# Patient Record
Sex: FEMALE | Race: OTHER | URBAN - METROPOLITAN AREA
[De-identification: names, ages, dates, MRNs, and addresses within clinical notes are randomized per-mention and may not be internally consistent; named-entity substitution may affect disease eponyms.]

---

## 2024-04-16 ENCOUNTER — OFFICE VISIT (OUTPATIENT)
Facility: CLINIC | Age: 29
End: 2024-04-16
Payer: MEDICAID

## 2024-04-16 VITALS
WEIGHT: 230 LBS | HEIGHT: 62.5 IN | BODY MASS INDEX: 41.27 KG/M2 | RESPIRATION RATE: 16 BRPM | OXYGEN SATURATION: 97 % | HEART RATE: 106 BPM | SYSTOLIC BLOOD PRESSURE: 132 MMHG | DIASTOLIC BLOOD PRESSURE: 84 MMHG

## 2024-04-16 DIAGNOSIS — J45.50 SEVERE PERSISTENT ASTHMA WITHOUT COMPLICATION (HCC): Primary | ICD-10-CM

## 2024-04-16 PROCEDURE — 99204 OFFICE O/P NEW MOD 45 MIN: CPT | Performed by: INTERNAL MEDICINE

## 2024-04-16 RX ORDER — ALBUTEROL SULFATE 90 UG/1
2 AEROSOL, METERED RESPIRATORY (INHALATION) EVERY 4 HOURS
COMMUNITY
Start: 2023-05-04

## 2024-04-16 RX ORDER — BUDESONIDE 0.5 MG/2ML
0.5 INHALANT ORAL 2 TIMES DAILY
Qty: 120 ML | Refills: 3 | Status: SHIPPED | OUTPATIENT
Start: 2024-04-16

## 2024-04-16 RX ORDER — FLUTICASONE FUROATE, UMECLIDINIUM BROMIDE AND VILANTEROL TRIFENATATE 200; 62.5; 25 UG/1; UG/1; UG/1
1 POWDER RESPIRATORY (INHALATION) DAILY
Qty: 60 EACH | Refills: 1 | Status: SHIPPED | OUTPATIENT
Start: 2024-04-16

## 2024-04-16 RX ORDER — MONTELUKAST SODIUM 10 MG/1
10 TABLET ORAL NIGHTLY
COMMUNITY
Start: 2023-12-17

## 2024-04-16 NOTE — PROGRESS NOTES
[facility] General Pulmonary Consult Note    Chief Complaint:  Chief Complaint   Patient presents with    New Patient     Pt having more frequent asthma attacks       History of Present Illness:  Shruthi Ludwig is a 29 year old female with significant PMH of asthma who presents today for evaluation of worsening of asthma symptoms.  Patient has been sick 3 times in 2023 reuslting in hospitalizations and ER visits/prednisone usage.  Initialy CBC showed significant elevated eosinphils 1080 follow up CBC not performed.  Previous peripheral eos have shown 580+.  Previous smoker, has quit about 6 months ago.  Reports significant anxiety around same time of having PTSD.      Past Medical History:   Past Medical History:    Allergic rhinitis    Anxiety    Asthma (HCC)    Depression    Obesity    Pneumonia due to organism        Past Surgical History:   Past Surgical History:   Procedure Laterality Date          Other surgical history  N/A    [place] teeth       Family Medical History:   Family History   Problem Relation Age of Onset    Depression Mother     Diabetes Mother         Social History:   Social History     Socioeconomic History    Marital status:      Spouse name: Not on file    Number of children: Not on file    Years of education: Not on file    Highest education level: Not on file   Occupational History    Not on file   Tobacco Use    Smoking status: Never     Passive exposure: Past    Smokeless tobacco: Never   Substance and Sexual Activity    Alcohol use: Not Currently    Drug use: Yes     Types: Cannabis     Comment: edibles    Sexual activity: Not on file   Other Topics Concern    Not on file   Social History Narrative    Not on file     Social Determinants of Health     Financial Resource Strain: Not on file   Food Insecurity: Low Risk  (3/21/2024)    Received from [facility]     Within the past 12 months, the food you bought just didn't last and you didn't have money  to get more.: 3     Within the past 12 months, you worried that your food would run out before you got money to buy more.: 3   Transportation Needs: Not At Risk (3/21/2024)    Received from [facility] Transportation Needs     In the past 12 months, has lack of reliable transportation kept you from medical appointments, meetings, work or from getting things needed for daily living?: No   Physical Activity: Not on file   Stress: Not on file   Social Connections: Not on file   Housing Stability: Not At Risk (3/21/2024)    Received from [facility] Housing     What is your living situation today?: I have a steady place to live     Think about the place you live. Do you have problems with any of the following?: None of the above        Allergies: Mushrooms     Medications:   Current Outpatient Medications   Medication Sig Dispense Refill    albuterol 108 (90 Base) MCG/ACT Inhalation Aero Soln Inhale 2 puffs into the lungs every 4 (four) hours.      montelukast 10 MG Oral Tab Take 1 tablet (10 mg total) by mouth nightly.      fluticasone-umeclidin-vilant (TRELEGY ELLIPTA) 200-62.5-25 MCG/ACT Inhalation Aerosol Powder, Breath Activated Inhale 1 puff into the lungs daily. 60 each 1    budesonide 0.5 MG/2ML Inhalation Suspension Take 2 mL (0.5 mg total) by nebulization 2 (two) times daily. 120 mL 3    Respiratory Therapy Supplies (FULL KIT NEBULIZER SET) Does not apply Misc 1 kit As Directed. 1 each 0       Review of Systems: Review of Systems    Physical Exam:  /84 (BP Location: Right arm, Patient Position: Sitting, Cuff Size: adult)   Pulse 106   Resp 16   Ht 5' 2.5\" (1.588 m)   Wt 230 lb (104.3 kg)   SpO2 97%   BMI 41.40 kg/m²      Constitutional: alert, cooperative. No acute distress.  HEENT: Head NC/AT. Nares normal. Septum midline. Mucosa normal. No drainage or sinus tenderness.. Mallampati 3+  Cardio: Regular rate and rhythm. Normal S1 and S2. No murmurs.   Respiratory: Thorax symmetrical  with no labored breathing. rhonchi bilaterally  GI: NABS. Abd soft, non-tender.  Extremities: No clubbing or cyanosis. No BLE edema.    Neurologic: A&Ox3. No gross motor deficits.  Skin: Warm, dry  Psych: Calm, cooperative. Pleasant affect.    Results:  Personally reviewed  No CBC panel on file.     No CMP panel on file.     No results found.     Assessment/Plan:  #1. Moderate vs severe persistent asthma  Significant eosinophilia noted on CBCs  Will start trelegy with albuterol as needed  Will start budesonide nebs  Check PFTs  Check allergy screen        Return in about 4 weeks (around 5/14/2024).    Melissa Glass MD  4/16/2024

## 2024-04-24 ENCOUNTER — TELEPHONE (OUTPATIENT)
Facility: CLINIC | Age: 29
End: 2024-04-24

## 2024-04-24 RX ORDER — DEXAMETHASONE 4 MG/1
2 TABLET ORAL 2 TIMES DAILY
Qty: 1 EACH | Refills: 1 | Status: SHIPPED | OUTPATIENT
Start: 2024-04-24 | End: 2024-04-24

## 2024-04-24 RX ORDER — UMECLIDINIUM 62.5 UG/1
1 AEROSOL, POWDER ORAL DAILY
Qty: 1 EACH | Refills: 1 | Status: SHIPPED | OUTPATIENT
Start: 2024-04-24

## 2024-04-24 RX ORDER — UMECLIDINIUM BROMIDE AND VILANTEROL TRIFENATATE 62.5; 25 UG/1; UG/1
1 POWDER RESPIRATORY (INHALATION) DAILY
Qty: 1 EACH | Refills: 1 | Status: SHIPPED | OUTPATIENT
Start: 2024-04-24 | End: 2024-04-24

## 2024-04-24 RX ORDER — BUDESONIDE AND FORMOTEROL FUMARATE DIHYDRATE 160; 4.5 UG/1; UG/1
2 AEROSOL RESPIRATORY (INHALATION) 2 TIMES DAILY
Qty: 1 EACH | Refills: 1 | Status: SHIPPED | OUTPATIENT
Start: 2024-04-24

## 2024-04-24 NOTE — TELEPHONE ENCOUNTER
Trelegy 200-62.5-25 not covered by insurance.  Advair disc, Advair HFA, Airduo, Dulera, Symbicort, and Anoro are preferred alternatives.  Please advise inhalers and strength to substitute.

## 2024-04-25 ENCOUNTER — TELEPHONE (OUTPATIENT)
Facility: CLINIC | Age: 29
End: 2024-04-25

## 2024-04-25 NOTE — TELEPHONE ENCOUNTER
Pt was approved for Anoro. Pt would need a steroid inhaler like Asmanex to add instead of the other which insurance denied.    Please note multiple TE regarding the same issue.

## 2024-04-25 NOTE — TELEPHONE ENCOUNTER
Advised pharmacist to remove Anoro from pt's profile.  Symbicort + Incruse are now covered.  Pt notified via message.

## 2024-05-06 ENCOUNTER — TELEPHONE (OUTPATIENT)
Facility: CLINIC | Age: 29
End: 2024-05-06

## 2024-05-06 NOTE — TELEPHONE ENCOUNTER
Pt cannot have labs or PFT done here at [facility].  Pt requesting they be sent to [facility].  Orders faxed to [phone]

## 2024-05-06 NOTE — TELEPHONE ENCOUNTER
Pt needed her PFT & Labs orders faxed to [facility].     Sent by hardcopy print to fax [phone]

## 2024-07-08 ENCOUNTER — OFFICE VISIT (OUTPATIENT)
Facility: CLINIC | Age: 29
End: 2024-07-08
Payer: MEDICAID

## 2024-07-08 VITALS
DIASTOLIC BLOOD PRESSURE: 52 MMHG | WEIGHT: 241 LBS | BODY MASS INDEX: 44.35 KG/M2 | OXYGEN SATURATION: 97 % | SYSTOLIC BLOOD PRESSURE: 132 MMHG | HEART RATE: 94 BPM | HEIGHT: 62 IN

## 2024-07-08 DIAGNOSIS — J45.50 SEVERE PERSISTENT ASTHMA WITHOUT COMPLICATION (HCC): Primary | ICD-10-CM

## 2024-07-08 DIAGNOSIS — J30.1 SEASONAL ALLERGIC RHINITIS DUE TO POLLEN: ICD-10-CM

## 2024-07-08 PROCEDURE — 99214 OFFICE O/P EST MOD 30 MIN: CPT | Performed by: INTERNAL MEDICINE

## 2024-07-08 RX ORDER — BUDESONIDE 0.5 MG/2ML
0.5 INHALANT ORAL DAILY
Qty: 120 ML | Refills: 5 | Status: SHIPPED | OUTPATIENT
Start: 2024-07-08

## 2024-07-08 NOTE — PROGRESS NOTES
[facility] Pulmonary Follow Up Note    Chief Complaint:  Chief Complaint   Patient presents with    Follow - Up     Follow up for PFT, said it was done through [facility]       History of Present Illness:  Shruthi Ludwig is a 29 year old female who presents today for follow up of asthma symptoms.  Patient has been sick 3 times in 2023 reuslting in hospitalizations and ER visits/prednisone usage.  Initialy CBC showed significant elevated eosinphils 1080 follow up CBC not performed.  Previous peripheral eos have shown 580+.  Previous smoker, has quit about 6 months ago.  Reports significant anxiety around same time of having PTSD.       Interval history:  Since last visit, patient started on maximal inhaler therapy with good improvement in symptoms.  Feels about 75% better.  On symbicort, incruse, and has nebulizer/albuteorl which she uses as needed, hasn't needed it much.  Still occasionally gets some chest tightness/sneezing around her dogs when dander goes into room but otherwise doing ok.      Past Medical History:   Past Medical History:    Allergic rhinitis    Anxiety    Asthma (HCC)    Depression    Obesity    Pneumonia due to organism        Past Surgical History:   Past Surgical History:   Procedure Laterality Date          Other surgical history  N/A    [place] teeth       Family Medical History:   Family History   Problem Relation Age of Onset    Depression Mother     Diabetes Mother         Social History:   Social History     Socioeconomic History    Marital status:      Spouse name: Not on file    Number of children: Not on file    Years of education: Not on file    Highest education level: Not on file   Occupational History    Not on file   Tobacco Use    Smoking status: Never     Passive exposure: Past    Smokeless tobacco: Never   Substance and Sexual Activity    Alcohol use: Not Currently    Drug use: Yes     Types: Cannabis     Comment: edibles    Sexual activity: Not on file    Other Topics Concern    Not on file   Social History Narrative    Not on file     Social Determinants of Health     Financial Resource Strain: Not on file   Food Insecurity: Low Risk  (3/21/2024)    Received from [facility]     Within the past 12 months, the food you bought just didn't last and you didn't have money to get more.: 3     Within the past 12 months, you worried that your food would run out before you got money to buy more.: 3   Transportation Needs: Not At Risk (3/21/2024)    Received from [facility] Transportation Needs     In the past 12 months, has lack of reliable transportation kept you from medical appointments, meetings, work or from getting things needed for daily living?: No   Physical Activity: Not on file   Stress: Not on file   Social Connections: Not on file   Housing Stability: Not At Risk (3/21/2024)    Received from [facility] Housing     What is your living situation today?: I have a steady place to live     Think about the place you live. Do you have problems with any of the following?: None of the above        Medications:   Current Outpatient Medications   Medication Sig Dispense Refill    SYMBICORT 160-4.5 MCG/ACT Inhalation Aerosol Inhale 2 puffs into the lungs 2 (two) times daily. Rinse mouth after use. 1 each 1    umeclidinium bromide (INCRUSE ELLIPTA) 62.5 MCG/ACT Inhalation Aerosol Powder, Breath Activated Inhale 1 puff into the lungs daily. 1 each 1    albuterol 108 (90 Base) MCG/ACT Inhalation Aero Soln Inhale 2 puffs into the lungs every 4 (four) hours.      montelukast 10 MG Oral Tab Take 1 tablet (10 mg total) by mouth nightly. (Patient not taking: Reported on 7/8/2024)      budesonide 0.5 MG/2ML Inhalation Suspension Take 2 mL (0.5 mg total) by nebulization 2 (two) times daily. (Patient not taking: Reported on 7/8/2024) 120 mL 3    Respiratory Therapy Supplies (FULL KIT NEBULIZER SET) Does not apply Misc 1 kit As Directed.  (Patient not taking: Reported on 7/8/2024) 1 each 0       Review of Systems: Review of Systems     Physical Exam:  /52 (BP Location: Right arm, Patient Position: Sitting, Cuff Size: adult)   Pulse 94   Ht 5' 2\" (1.575 m)   Wt 241 lb (109.3 kg)   SpO2 97%   BMI 44.08 kg/m²      Constitutional: alert, cooperative. No acute distress.  HEENT: Head NC/AT. Nares normal. Septum midline. Mucosa normal. No drainage or sinus tenderness.  Cardio: Regular rate and rhythm. Normal S1 and S2. No murmurs.   Respiratory: Thorax symmetrical with no labored breathing. clear to auscultation bilaterally  Extremities: No clubbing or cyanosis. No BLE edema.    Neurologic: A&Ox3. No gross motor deficits.  Skin: Warm, dry  Psych: Calm, cooperative. Pleasant affect.    Results:  Personally reviewed  No image results found.      PFTs:       No data to display                   No data to display                    No CBC panel on file.     No CMP panel on file.     No results found.     Assessment/Plan:  #1. Severe persistant asthma  Reviewed labs from [facility] with peripheral eosinophilia 550  On maximal inhaler therapy  We discussed role of biologic therapy including pros, cons, alternatives  Will plan to watch her through this year and see how much predniosne she is requiring  If still having exacerbation have low threshold to start budesonie nebs BID and if still having exacerbations over this year I believe biologics would be best option for her for her long term health from long term prednisone usage  All questions answered    Return in about 3 months (around 10/8/2024).    I spent 30 minutes obtaining and reviewing records, preparing for the visit including reviewing chart and prior testing, face to face time examining the patient and obtaining history, counseling, arranging and reviewing office-based testing, independently reviewing relevant studies and documentation exclusive of other billable procedures.      Melissa  Scout Glass MD  7/8/2024

## 2024-07-10 ENCOUNTER — TELEPHONE (OUTPATIENT)
Facility: CLINIC | Age: 29
End: 2024-07-10

## 2024-07-10 NOTE — TELEPHONE ENCOUNTER
[facility] Budes[facility]ide needs [facility]. PA done and faxed to [facility]. Awaiting outcome.

## 2024-07-11 NOTE — TELEPHONE ENCOUNTER
Received fax from [facility], request for budesonide .5mg/2 ml has been approved starting 7/10/24-7/10/2025. [facility] pharmacy called and verified, it has been approved and will work on dispensing. Pt called, did not answer. MCM sent making aware of the above and to contact our office if has any questions.

## 2024-09-09 RX ORDER — BUDESONIDE AND FORMOTEROL FUMARATE DIHYDRATE 80; 4.5 UG/1; UG/1
2 AEROSOL RESPIRATORY (INHALATION) 2 TIMES DAILY
Qty: 1 EACH | Refills: 0 | Status: SHIPPED | OUTPATIENT
Start: 2024-09-09

## 2024-09-09 RX ORDER — BUDESONIDE AND FORMOTEROL FUMARATE DIHYDRATE 160; 4.5 UG/1; UG/1
2 AEROSOL RESPIRATORY (INHALATION) 2 TIMES DAILY
Qty: 10.2 G | Refills: 0 | Status: SHIPPED | OUTPATIENT
Start: 2024-09-09 | End: 2024-09-09

## 2024-09-09 RX ORDER — UMECLIDINIUM 62.5 UG/1
1 AEROSOL, POWDER ORAL DAILY
Qty: 1 EACH | Refills: 0 | Status: SHIPPED | OUTPATIENT
Start: 2024-09-09

## 2024-09-09 NOTE — TELEPHONE ENCOUNTER
Received a call from [facility] pharmacy stating the brand name Symbicort needed a Prior authorization.  The generic is covered by patient's insurance.  New script sent for budesonide/Formoterol.

## 2024-09-09 NOTE — TELEPHONE ENCOUNTER
Pt last seen by Dr. Glass on 4-16-24.  Prescription was changed from Trelegy to Symbicort and Incruse on 4-24-24.  Pt advised to get pulmonary function test (PFT) and follow up in 4 weeks.  PFT and follow up appointment not completed.  Has appointment for follow up with Dr. Glass.  Gopeers message sent to pt to remind her to schedule a PFT.  Refills for Symbicort and Incruse sent to pharmacy.

## 2024-10-08 ENCOUNTER — OFFICE VISIT (OUTPATIENT)
Age: 29
End: 2024-10-08
Payer: COMMERCIAL

## 2024-10-08 VITALS
WEIGHT: 233.38 LBS | OXYGEN SATURATION: 99 % | HEIGHT: 62 IN | SYSTOLIC BLOOD PRESSURE: 128 MMHG | RESPIRATION RATE: 16 BRPM | BODY MASS INDEX: 42.95 KG/M2 | DIASTOLIC BLOOD PRESSURE: 74 MMHG | HEART RATE: 100 BPM

## 2024-10-08 DIAGNOSIS — J45.50 SEVERE PERSISTENT ASTHMA WITHOUT COMPLICATION (HCC): Primary | ICD-10-CM

## 2024-10-08 DIAGNOSIS — J30.1 SEASONAL ALLERGIC RHINITIS DUE TO POLLEN: ICD-10-CM

## 2024-10-08 PROCEDURE — 3074F SYST BP LT 130 MM HG: CPT | Performed by: INTERNAL MEDICINE

## 2024-10-08 PROCEDURE — 3078F DIAST BP <80 MM HG: CPT | Performed by: INTERNAL MEDICINE

## 2024-10-08 PROCEDURE — 3008F BODY MASS INDEX DOCD: CPT | Performed by: INTERNAL MEDICINE

## 2024-10-08 PROCEDURE — 99213 OFFICE O/P EST LOW 20 MIN: CPT | Performed by: INTERNAL MEDICINE

## 2024-10-08 RX ORDER — UMECLIDINIUM 62.5 UG/1
1 AEROSOL, POWDER ORAL DAILY
Qty: 1 EACH | Refills: 0 | Status: SHIPPED | OUTPATIENT
Start: 2024-10-08

## 2024-10-08 RX ORDER — BUDESONIDE AND FORMOTEROL FUMARATE DIHYDRATE 160; 4.5 UG/1; UG/1
2 AEROSOL RESPIRATORY (INHALATION) 2 TIMES DAILY
Qty: 1 EACH | Refills: 11 | Status: SHIPPED | OUTPATIENT
Start: 2024-10-08

## 2024-10-08 RX ORDER — MONTELUKAST SODIUM 10 MG/1
10 TABLET ORAL NIGHTLY
Qty: 60 TABLET | Refills: 1 | Status: SHIPPED | OUTPATIENT
Start: 2024-10-08

## 2024-10-08 RX ORDER — ALBUTEROL SULFATE 90 UG/1
2 INHALANT RESPIRATORY (INHALATION) EVERY 4 HOURS
Qty: 1 EACH | Refills: 11 | Status: SHIPPED | OUTPATIENT
Start: 2024-10-08

## 2024-10-08 NOTE — PATIENT INSTRUCTIONS
Call office with any questions or concerns  Call office if develop any new or worsening respiratory symptoms.   Continue to use  Incruse, albuterol, and nebs  Follow up in 6 months

## 2024-10-08 NOTE — PROGRESS NOTES
[facility] Pulmonary Follow Up Note    History of Present Illness:  Shruthi Ludwig is a 29 year old female who presents today for follow up of asthma symptoms.Reports unable to received medications in a timely manor after last appointment. Now taking Incruse daily, Symbicort BID, and albuterol as needed. Feels that all three inhalers have been helping managing asthma symptoms. The beginning of this week had an anxiety attack that provoked an asthma attack, in which she used the budesonide nebulizer for symptom control. Seeing a psychiatrist to help control anxiety attacks.   Denies cough, SOB, fever, chills  Reports recent weight loss- trying to eat healthier and going on walks, RIBEIRO    Previously 2024 Dr Gomez   Patient has been sick 3 times in 2023 reuslting in hospitalizations and ER visits/prednisone usage.  Initialy CBC showed significant elevated eosinphils 1080 follow up CBC not performed.  Previous peripheral eos have shown 580+.  Previous smoker, has quit about 6 months ago.  Reports significant anxiety around same time of having PTSD.       Interval history:  Since last visit, patient started on maximal inhaler therapy with good improvement in symptoms.  Feels about 75% better.  On symbicort, incruse, and has nebulizer/albuteorl which she uses as needed, hasn't needed it much.  Still occasionally gets some chest tightness/sneezing around her dogs when dander goes into room but otherwise doing ok.      Past Medical History:   Past Medical History:    Allergic rhinitis    Anxiety    Asthma (HCC)    Depression    Obesity    Pneumonia due to organism        Past Surgical History:   Past Surgical History:   Procedure Laterality Date          Other surgical history  N/A    [place] teeth       Family Medical History:   Family History   Problem Relation Age of Onset    Depression Mother     Diabetes Mother         Social History:   Social History     Socioeconomic History    Marital status:       Spouse name: Not on file    Number of children: Not on file    Years of education: Not on file    Highest education level: Not on file   Occupational History    Not on file   Tobacco Use    Smoking status: Never     Passive exposure: Past    Smokeless tobacco: Never   Substance and Sexual Activity    Alcohol use: Not Currently    Drug use: Yes     Types: Cannabis     Comment: edibles    Sexual activity: Not on file   Other Topics Concern    Not on file   Social History Narrative    Not on file     Social Determinants of Health     Financial Resource Strain: Not on file   Food Insecurity: Low Risk  (3/21/2024)    Received from [facility]     Within the past 12 months, the food you bought just didn't last and you didn't have money to get more.: 3     Within the past 12 months, you worried that your food would run out before you got money to buy more.: 3   Transportation Needs: Not At Risk (3/21/2024)    Received from [facility] Transportation Needs     In the past 12 months, has lack of reliable transportation kept you from medical appointments, meetings, work or from getting things needed for daily living?: No   Physical Activity: Not on file   Stress: Not on file   Social Connections: Not on file   Housing Stability: Not At Risk (3/21/2024)    Received from [facility] Housing     What is your living situation today?: I have a steady place to live     Think about the place you live. Do you have problems with any of the following?: None of the above        Medications:   Current Outpatient Medications   Medication Sig Dispense Refill    umeclidinium bromide (INCRUSE ELLIPTA) 62.5 MCG/ACT Inhalation Aerosol Powder, Breath Activated INHALE 1 PUFF BY MOUTH EVERY DAY 1 each 0    Budesonide-Formoterol Fumarate 80-4.5 MCG/ACT Inhalation Aerosol Inhale 2 puffs into the lungs 2 (two) times daily. 1 each 0    budesonide 0.5 MG/2ML Inhalation Suspension Take 2 mL (0.5 mg total) by  nebulization daily. 120 mL 5    albuterol 108 (90 Base) MCG/ACT Inhalation Aero Soln Inhale 2 puffs into the lungs every 4 (four) hours.      montelukast 10 MG Oral Tab Take 1 tablet (10 mg total) by mouth nightly. (Patient not taking: Reported on 7/8/2024)      budesonide 0.5 MG/2ML Inhalation Suspension Take 2 mL (0.5 mg total) by nebulization 2 (two) times daily. (Patient not taking: Reported on 7/8/2024) 120 mL 3    Respiratory Therapy Supplies (FULL KIT NEBULIZER SET) Does not apply Misc 1 kit As Directed. (Patient not taking: Reported on 7/8/2024) 1 each 0       Review of Systems: Review of Systems   Constitutional: Negative.    HENT: Negative.     Respiratory:  Positive for shortness of breath.    Psychiatric/Behavioral: Negative.          Physical Exam:  /74 (BP Location: Right arm, Patient Position: Sitting, Cuff Size: adult)   Pulse 100   Resp 16   Ht 5' 2\" (1.575 m)   Wt 233 lb 6.4 oz (105.9 kg)   SpO2 99%   BMI 42.69 kg/m²      Constitutional: alert, cooperative. No acute distress.  HEENT: Head NC/AT. Nares normal. Septum midline. Mucosa normal. No drainage or sinus tenderness.  Cardio: Regular rate and rhythm. Normal S1 and S2. No murmurs.   Respiratory: Thorax symmetrical with no labored breathing. clear to auscultation bilaterally  Extremities: No clubbing or cyanosis. No BLE edema.    Neurologic: A&Ox3. No gross motor deficits.  Skin: Warm, dry  Psych: Calm, cooperative. Pleasant affect.    Results:  Personally reviewed  No image results found.      PFTs:       No data to display                   No data to display                    No CBC panel on file.     No CMP panel on file.     No results found.     Assessment/Plan:  #1. Severe persistant asthma  Reviewed labs from [facility] with peripheral eosinophilia 550  On maximal inhaler therapy  We discussed role of biologic therapy including pros, cons, alternatives  Will plan to watch her through this year and see how much predniosne she  is requiring  If still having exacerbation have low threshold to start budesonie nebs BID and if still having exacerbations over this year I believe biologics would be best option for her for her long term health from long term prednisone usage  Continue Incruse, symbicort, nebs/albuterol as needed  Follow up in 6 months  All questions answered      I spent 30 minutes obtaining and reviewing records, preparing for the visit including reviewing chart and prior testing, face to face time examining the patient and obtaining history, counseling, arranging and reviewing office-based testing, independently reviewing relevant studies and documentation exclusive of other billable procedures.      Celina WILLIAMSON, FNP-BC  [facility] Pulmonary Medicine   10/8/2024

## 2024-10-10 ENCOUNTER — HOSPITAL ENCOUNTER (OUTPATIENT)
Age: 29
Discharge: HOME | End: 2024-10-10
Payer: COMMERCIAL

## 2024-10-10 VITALS
SYSTOLIC BLOOD PRESSURE: 106 MMHG | OXYGEN SATURATION: 99 % | DIASTOLIC BLOOD PRESSURE: 61 MMHG | RESPIRATION RATE: 22 BRPM | HEIGHT: 62 IN | BODY MASS INDEX: 42.69 KG/M2 | WEIGHT: 232 LBS | HEART RATE: 79 BPM | TEMPERATURE: 98 F

## 2024-10-10 DIAGNOSIS — R09.81 NASAL CONGESTION: Primary | ICD-10-CM

## 2024-10-10 LAB
S PYO AG THROAT QL IA.RAPID: NEGATIVE
SARS-COV-2 RNA RESP QL NAA+PROBE: NOT DETECTED

## 2024-10-10 PROCEDURE — 99203 OFFICE O/P NEW LOW 30 MIN: CPT

## 2024-10-10 PROCEDURE — 94640 AIRWAY INHALATION TREATMENT: CPT

## 2024-10-10 PROCEDURE — 87651 STREP A DNA AMP PROBE: CPT | Performed by: NURSE PRACTITIONER

## 2024-10-10 PROCEDURE — 99214 OFFICE O/P EST MOD 30 MIN: CPT

## 2024-10-10 RX ORDER — PREDNISONE 20 MG/1
40 TABLET ORAL DAILY
Qty: 8 TABLET | Refills: 0 | Status: SHIPPED | OUTPATIENT
Start: 2024-10-10 | End: 2024-10-14

## 2024-10-10 RX ORDER — PREDNISONE 20 MG/1
40 TABLET ORAL ONCE
Status: COMPLETED | OUTPATIENT
Start: 2024-10-10 | End: 2024-10-10

## 2024-10-10 RX ORDER — IPRATROPIUM BROMIDE AND ALBUTEROL SULFATE 2.5; .5 MG/3ML; MG/3ML
3 SOLUTION RESPIRATORY (INHALATION) ONCE
Status: COMPLETED | OUTPATIENT
Start: 2024-10-10 | End: 2024-10-10

## 2024-10-10 NOTE — ED PROVIDER NOTES
Patient Seen in: [facility]      History     Chief Complaint   Patient presents with    Sore Throat     Stated Complaint: Throat issue    Subjective:   HPI  29-year-old breast-feeding female with allergic rhinitis, anxiety, asthma, depression, and pneumonia presents complaining of 2 days with nasal congestion and a sore throat.  She takes Singulair but does not take a daily antihistamine.  She denies any fever or chills.    Objective:     Past Medical History:    Allergic rhinitis    Anxiety    Asthma (HCC)    Depression    Obesity    Pneumonia due to organism              Past Surgical History:   Procedure Laterality Date          Other surgical history  N/A    [place] teeth                Social History     Socioeconomic History    Marital status:    Tobacco Use    Smoking status: Never     Passive exposure: Past    Smokeless tobacco: Never   Substance and Sexual Activity    Alcohol use: Not Currently    Drug use: Yes     Types: Cannabis     Comment: edibles     Social Drivers of Health     Food Insecurity: Low Risk  (3/21/2024)    Received from [facility] Food Security     Within the past 12 months, the food you bought just didn't last and you didn't have money to get more.: 3     Within the past 12 months, you worried that your food would run out before you got money to buy more.: 3   Transportation Needs: Not At Risk (3/21/2024)    Received from [facility] Transportation Needs     In the past 12 months, has lack of reliable transportation kept you from medical appointments, meetings, work or from getting things needed for daily living?: No   Housing Stability: Not At Risk (3/21/2024)    Received from [facility] Housing     What is your living situation today?: I have a steady place to live     Think about the place you live. Do you have problems with any of the following?: None of the above              Review of Systems   All other systems reviewed and are  negative.      Positive for stated complaint: Throat issue  Other systems are as noted in HPI.  Constitutional and vital signs reviewed.      All other systems reviewed and negative except as noted above.    Physical Exam     ED Triage Vitals [10/10/24 1638]   /61   Pulse 79   Resp 22   Temp 97.5 °F (36.4 °C)   Temp src Temporal   SpO2 99 %   O2 Device None (Room air)       Current Vitals:   Vital Signs  BP: 106/61  Pulse: 79  Resp: 22  Temp: 97.5 °F (36.4 °C)  Temp src: Temporal    Oxygen Therapy  SpO2: 99 %  O2 Device: None (Room air)        Physical Exam  Vitals and nursing note reviewed.   Constitutional:       General: She is not in acute distress.     Appearance: She is well-developed. She is not ill-appearing or toxic-appearing.   HENT:      Right Ear: Tympanic membrane and ear canal normal.      Left Ear: Tympanic membrane and ear canal normal.      Nose: Congestion present. No rhinorrhea.      Mouth/Throat:      Pharynx: Uvula midline. Posterior oropharyngeal erythema present. No pharyngeal swelling, oropharyngeal exudate or uvula swelling.      Tonsils: No tonsillar exudate or tonsillar abscesses. 0 on the right. 0 on the left.      Comments: No tonsillar hypertrophy or exudates.  No trismus  Cardiovascular:      Rate and Rhythm: Normal rate and regular rhythm.      Heart sounds: Normal heart sounds.   Pulmonary:      Effort: Pulmonary effort is normal.      Breath sounds: Normal breath sounds.   Skin:     General: Skin is warm and dry.   Neurological:      Mental Status: She is alert and oriented to person, place, and time.             ED Course     Labs Reviewed   RAPID STREP A - Normal   RAPID SARS-COV-2 BY PCR - Normal                   MDM     Medical Decision Making  29-year-old breast-feeding female with allergic rhinitis, anxiety, asthma, depression, and pneumonia presents complaining of 2 days with nasal congestion and a sore throat.  She takes Singulair but does not take a daily  antihistamine.  She denies any fever or chills.    Pertinent Labs & Imaging studies reviewed. (See chart for details).  Patient coming in with sore throat and congestion.   Differential diagnosis includes but not limited to virus, congestion, strep, COVID  Labs reviewed strep and COVID-negative.   Will treat for nasal congestion.  Will discharge on prednisone and Claritin. Patient/Parent is comfortable with this plan.    Overall Pt looks good. Non-toxic, well-hydrated and in no respiratory distress. Vital signs are reassuring. Exam is reassuring. I do not believe pt requires and additional diagnostic studies or intervention. I believe pt can be discharged home to continue evaluation as an outpatient. Follow-up provider given. Discharge instructions given and reviewed. Return for any problems. All understand and agree with the plan.  Prednisone given with DuoNeb.  Symptoms improved.      Problems Addressed:  Nasal congestion: acute illness or injury        Disposition and Plan     Clinical Impression:  1. Nasal congestion         Disposition:  Discharge  10/10/2024  5:50 pm    Follow-up:  No follow-up provider specified.        Medications Prescribed:  Discharge Medication List as of 10/10/2024  6:01 PM        START taking these medications    Details   predniSONE 20 MG Oral Tab Take 2 tablets (40 mg total) by mouth daily for 4 days., Normal, Disp-8 tablet, R-0                 Supplementary Documentation:

## 2024-10-10 NOTE — DISCHARGE INSTRUCTIONS
Take Claritin daily.  Use prednisone as directed with next dose tomorrow.  Continue Albuterol and Singulair.

## 2024-12-07 ENCOUNTER — APPOINTMENT (OUTPATIENT)
Dept: GENERAL RADIOLOGY | Age: 29
End: 2024-12-07
Attending: NURSE PRACTITIONER
Payer: COMMERCIAL

## 2024-12-07 ENCOUNTER — HOSPITAL ENCOUNTER (OUTPATIENT)
Age: 29
Discharge: HOME | End: 2024-12-07
Payer: COMMERCIAL

## 2024-12-07 VITALS
WEIGHT: 180 LBS | HEART RATE: 72 BPM | SYSTOLIC BLOOD PRESSURE: 107 MMHG | OXYGEN SATURATION: 98 % | BODY MASS INDEX: 33.13 KG/M2 | TEMPERATURE: 99 F | DIASTOLIC BLOOD PRESSURE: 62 MMHG | RESPIRATION RATE: 20 BRPM | HEIGHT: 62 IN

## 2024-12-07 DIAGNOSIS — M25.531 RIGHT WRIST PAIN: Primary | ICD-10-CM

## 2024-12-07 PROCEDURE — 73110 X-RAY EXAM OF WRIST: CPT | Performed by: NURSE PRACTITIONER

## 2024-12-07 PROCEDURE — 99213 OFFICE O/P EST LOW 20 MIN: CPT

## 2024-12-07 RX ORDER — ACETAMINOPHEN 325 MG/1
650 TABLET ORAL ONCE
Status: COMPLETED | OUTPATIENT
Start: 2024-12-07 | End: 2024-12-07

## 2024-12-07 NOTE — DISCHARGE INSTRUCTIONS
Rest. Don’t use the injured body part, or limit  the amount of time it is used. This will give your  injury time to heal and prevent further harm.    2.  Ice. Ice helps to keep swelling down, which  relieves pain and pressure. To ice your injury,  simply wrap a bag of ice, a frozen gel pack, or a  bag of frozen vegetables in a thin towel. Place it  on the injured area and leave it in place for only 15  minutes. Do this once every hour for 1 to 3 days.    3. Compression. Compression wraps are bandages  that prevent swelling and help keep your injury  stable. Compression is usually done with  an elastic bandage (sometimes called an Ace  bandage).     4  Elevation. To help keep swelling down, elevate  (raise) the injured body part above the level of  your heart. You can do this by placing pillows  under the injured area until it is higher than your  chest when lying down. Try to do this as much  as possible.    May take Tylenol as directed.     Follow up with Ortho; referral was provided.     If any numbness, tingling, or color change to extremity please seek immediate medical care.

## 2024-12-07 NOTE — ED INITIAL ASSESSMENT (HPI)
Right wrist pain started on Monday when she woke up, hurts to bend or twist it. No injury to wrist.

## 2024-12-11 PROBLEM — J96.01 ACUTE HYPOXIC RESPIRATORY FAILURE (HCC): Status: ACTIVE | Noted: 2023-12-15

## 2024-12-11 PROBLEM — F41.9 ANXIETY: Status: ACTIVE | Noted: 2024-12-11

## 2024-12-11 PROBLEM — E87.20 METABOLIC ACIDOSIS: Status: ACTIVE | Noted: 2023-12-15

## 2024-12-11 PROBLEM — R73.9 HYPERGLYCEMIA: Status: ACTIVE | Noted: 2023-12-15

## 2024-12-11 PROBLEM — A41.9 SEPSIS (HCC): Status: ACTIVE | Noted: 2023-12-15

## 2024-12-11 PROBLEM — F32.A DEPRESSIVE DISORDER: Status: ACTIVE | Noted: 2024-12-11

## 2024-12-11 PROBLEM — I10 BENIGN HYPERTENSION: Status: ACTIVE | Noted: 2024-12-11

## 2024-12-11 PROBLEM — O14.10 SEVERE PREECLAMPSIA (HCC): Status: ACTIVE | Noted: 2024-12-11

## 2024-12-11 PROBLEM — I10 HYPERTENSIVE DISORDER: Status: ACTIVE | Noted: 2021-08-09

## 2024-12-11 PROBLEM — J45.51 SEVERE PERSISTENT ASTHMA WITH ACUTE EXACERBATION (HCC): Status: ACTIVE | Noted: 2024-03-21

## 2024-12-11 PROBLEM — J30.2 SEASONAL ALLERGIES: Status: ACTIVE | Noted: 2020-01-15

## 2024-12-11 NOTE — ED PROVIDER NOTES
Patient Seen in: [facility]      History     Chief Complaint   Patient presents with    Wrist Injury     Stated Complaint: Wrist Issue    Subjective:   29-year-old female presents with complaint of right wrist pain that started on Monday when she woke up.  It hurts to bend or twist it.  There was no trauma, fall or other injury to wrist.  Patient works in  and carries trays.   Patient endorses that she often sleeps on her side with her wrist tucked under her chin.  No prior injury to this wrist.    No OTCs taken.    Patient denies numbness tingling.       The history is provided by the patient.             Objective:     Past Medical History:    Allergic rhinitis    Anxiety    Asthma (HCC)    Depression    Obesity    Pneumonia due to organism              Past Surgical History:   Procedure Laterality Date          Other surgical history  N/A    [place] teeth                Social History     Socioeconomic History    Marital status:    Tobacco Use    Smoking status: Never     Passive exposure: Past    Smokeless tobacco: Never   Substance and Sexual Activity    Alcohol use: Not Currently    Drug use: Yes     Types: Cannabis     Comment: edibles     Social Drivers of Health     Food Insecurity: Low Risk  (3/21/2024)    Received from [facility]     Within the past 12 months, the food you bought just didn't last and you didn't have money to get more.: 3     Within the past 12 months, you worried that your food would run out before you got money to buy more.: 3   Transportation Needs: Not At Risk (3/21/2024)    Received from [facility] Transportation Needs     In the past 12 months, has lack of reliable transportation kept you from medical appointments, meetings, work or from getting things needed for daily living?: No   Housing Stability: Not At Risk (3/21/2024)    Received from [facility] Housing     What is your living situation today?: I  have a steady place to live     Think about the place you live. Do you have problems with any of the following?: None of the above              Review of Systems   Constitutional:  Negative for chills and fever.   Neurological:  Negative for numbness.       Positive for stated complaint: Wrist Issue  Other systems are as noted in HPI.  Constitutional and vital signs reviewed.      All other systems reviewed and negative except as noted above.    Physical Exam     ED Triage Vitals [12/07/24 1255]   /62   Pulse 72   Resp 20   Temp 98.5 °F (36.9 °C)   Temp src Oral   SpO2 98 %   O2 Device None (Room air)       Current Vitals:   No data recorded    Physical Exam  Vitals and nursing note reviewed.   Constitutional:       General: She is not in acute distress.     Appearance: Normal appearance. She is not ill-appearing.   HENT:      Head: Normocephalic and atraumatic.      Nose: Nose normal.   Eyes:      Conjunctiva/sclera: Conjunctivae normal.   Musculoskeletal:      Right forearm: Normal.      Right wrist: Tenderness present. No swelling, deformity, lacerations, bony tenderness or snuff box tenderness. Decreased range of motion (mild tenderness with extension). Normal pulse.      Right hand: Normal.      Cervical back: Normal range of motion and neck supple.   Skin:     General: Skin is warm and dry.   Neurological:      Mental Status: She is alert.   Psychiatric:         Mood and Affect: Mood normal.             ED Course   Labs Reviewed - No data to display  XR WRIST COMPLETE (MIN 3 VIEWS), RIGHT (CPT=73110)          PROCEDURE:  XR WRIST COMPLETE (MIN 3 VIEWS), RIGHT (CPT=73110)     TECHNIQUE:  Three views were obtained.     COMPARISON:  None.     INDICATIONS:  wrist pain x 5 days     PATIENT STATED HISTORY: (As transcribed by Technologist)  Patient states she has had ulnar side wrist pain for four days with no known injury.         FINDINGS:    BONES:  Normal.  No significant arthropathy or acute  abnormality.  SOFT TISSUES:  Negative.  No visible soft tissue swelling.  EFFUSION:  None visible.  OTHER:  Negative.                   =====  CONCLUSION:  No acute disease.          LOCATION:  [facility]        Dictated by (CST): Nolan Garcia MD on 12/07/2024 at 2:30 PM       Finalized by (CST): Nolan Garcia MD on 12/07/2024 at 2:30 PM               MDM      Medical Decision Making  29-year-old female presents with complaint of atraumatic, right wrist pain that started on Monday when she woke up.  Differential diagnoses include sprain versus tendinitis versus carpal tunnel.  On exam patient is well-appearing with normal vitals.  X-ray without acute findings.  Recommend rest, ibuprofen with food, velcro wrist splint to right wrist.   Ortho referral provided.    Return precautions discussed.  Patient agreeable to plan.    Amount and/or Complexity of Data Reviewed  External Data Reviewed: notes.  Radiology: ordered.    Risk  OTC drugs.        Disposition and Plan     Clinical Impression:  1. Right wrist pain         Disposition:  Discharge  12/7/2024  2:35 pm    Follow-up:  Aureliano Carrillo PA  [address] [place] [id number]  [phone]                Medications Prescribed:  Discharge Medication List as of 12/7/2024  2:38 PM              Supplementary Documentation:

## 2024-12-18 ENCOUNTER — HOSPITAL ENCOUNTER (OUTPATIENT)
Age: 29
Discharge: HOME | End: 2024-12-18
Payer: COMMERCIAL

## 2024-12-18 VITALS
RESPIRATION RATE: 20 BRPM | WEIGHT: 230 LBS | BODY MASS INDEX: 42.33 KG/M2 | TEMPERATURE: 99 F | DIASTOLIC BLOOD PRESSURE: 62 MMHG | SYSTOLIC BLOOD PRESSURE: 108 MMHG | OXYGEN SATURATION: 98 % | HEART RATE: 107 BPM | HEIGHT: 62 IN

## 2024-12-18 DIAGNOSIS — B34.9 VIRAL ILLNESS: Primary | ICD-10-CM

## 2024-12-18 LAB — S PYO AG THROAT QL IA.RAPID: NEGATIVE

## 2024-12-18 PROCEDURE — 87651 STREP A DNA AMP PROBE: CPT | Performed by: NURSE PRACTITIONER

## 2024-12-18 PROCEDURE — 99213 OFFICE O/P EST LOW 20 MIN: CPT

## 2024-12-18 PROCEDURE — 99214 OFFICE O/P EST MOD 30 MIN: CPT

## 2024-12-18 RX ORDER — ONDANSETRON 4 MG/1
4 TABLET, ORALLY DISINTEGRATING ORAL EVERY 4 HOURS PRN
Qty: 10 TABLET | Refills: 0 | Status: SHIPPED | OUTPATIENT
Start: 2024-12-18 | End: 2024-12-25

## 2024-12-18 RX ORDER — FLUTICASONE PROPIONATE 50 MCG
2 SPRAY, SUSPENSION (ML) NASAL DAILY
Qty: 16 G | Refills: 0 | Status: SHIPPED | OUTPATIENT
Start: 2024-12-18 | End: 2025-01-17

## 2024-12-18 NOTE — ED PROVIDER NOTES
Patient Seen in: [facility]      History     Chief Complaint   Patient presents with    Headache     Stated Complaint: fever headache stomach issues    Subjective:   29-year-old female presents today with complaints of headache, congestion runny nose, cough and nausea vomiting.  Symptoms over the last 4 to 5 days.  Denies any vomiting today however did have nausea all day.  Has had decreased appetite able to tolerate fluids however.  Denies any shortness of breath or wheezing.  Has had fever as high as 103, fever is borderline here.  Patient is alert orientated x 3.  No other symptoms or concerns.  The patient's medication list, past medical history and social history elements as listed in today's nurse's notes were reviewed and agreed (except as otherwise stated in the HPI).  The patient's family history reviewed and determined to be noncontributory to the presenting problem              Objective:     Past Medical History:    Allergic rhinitis    Anxiety    Asthma (HCC)    Depression    Obesity    Pneumonia due to organism              Past Surgical History:   Procedure Laterality Date          Other surgical history  N/A    [place] teeth                Social History     Socioeconomic History    Marital status:    Tobacco Use    Smoking status: Never     Passive exposure: Past    Smokeless tobacco: Never   Substance and Sexual Activity    Alcohol use: Not Currently    Drug use: Yes     Types: Cannabis     Comment: edibles     Social Drivers of Health     Food Insecurity: Low Risk  (3/21/2024)    Received from [facility] Food [facility]     Within the past 12 months, the food you bought just didn't last and you didn't have money to get more.: 3     Within the past 12 months, you worried that your food would run out before you got money to buy more.: 3   Transportation Needs: Not At Risk (3/21/2024)    Received from [facility] Transportation Needs     In the past 12  months, has lack of reliable transportation kept you from medical appointments, meetings, work or from getting things needed for daily living?: No   Housing Stability: Not At Risk (3/21/2024)    Received from [facility] Housing     What is your living situation today?: I have a steady place to live     Think about the place you live. Do you have problems with any of the following?: None of the above              Review of Systems    Positive for stated complaint: fever headache stomach issues  Other systems are as noted in HPI.  Constitutional and vital signs reviewed.      All other systems reviewed and negative except as noted above.    Physical Exam     ED Triage Vitals [12/18/24 1655]   /62   Pulse 107   Resp 20   Temp 99.4 °F (37.4 °C)   Temp src Oral   SpO2 98 %   O2 Device None (Room air)       Current Vitals:   Vital Signs  BP: 108/62  Pulse: 107  Resp: 20  Temp: 99.4 °F (37.4 °C)  Temp src: Oral    Oxygen Therapy  SpO2: 98 %  O2 Device: None (Room air)        Physical Exam  Vitals and nursing note reviewed.   Constitutional:       Appearance: She is well-developed.   HENT:      Head: Normocephalic.      Right Ear: Tympanic membrane and ear canal normal.      Left Ear: Tympanic membrane and ear canal normal.      Nose: Mucosal edema and congestion present.      Mouth/Throat:      Pharynx: Uvula midline. Posterior oropharyngeal erythema present.   Eyes:      Conjunctiva/sclera: Conjunctivae normal.      Pupils: Pupils are equal, round, and reactive to light.   Cardiovascular:      Rate and Rhythm: Normal rate and regular rhythm.   Pulmonary:      Effort: Pulmonary effort is normal.      Breath sounds: Normal breath sounds.   Musculoskeletal:      Cervical back: Normal range of motion and neck supple.   Skin:     General: Skin is warm and dry.   Neurological:      Mental Status: She is alert and oriented to person, place, and time.             ED Course     Labs Reviewed   RAPID STREP A                    MDM     Please note that this report has been produced using speech recognition software and may contain errors related to that system including, but not limited to, errors in grammar, punctuation, and spelling, as well as words and phrases that possibly may have been recognized inappropriately.  If there are any questions or concerns, contact the dictating provider for clarification.              Medical Decision Making  Differential diagnosis includes but is not limited to: COVID-19, viral URI, strep throat, influenza, pneumonia, sinusitis, bronchitis      Presented today with complaints of sinus congestion runny nose headache and a cough.  Also history of vomiting, did have nausea today.  Has had decreased appetite.  Patient recent exposure to RSV and strep however denies any sore throat.  Patient states that her dog is not doing well at home and would like to come back at another time for further evaluation and treatment.  Will give a prescription for Zofran and Flonase for home.  Diet restrictions were given.  To follow-up with her primary care physician 1 week if symptoms do not improve or may return here sooner for chest x-ray especially symptoms worsen.  Patient verbalized understanding and agreed to plan of care.    Risk  OTC drugs.  Prescription drug management.        Disposition and Plan     Clinical Impression:  1. Viral illness         Disposition:  Discharge  12/18/2024  5:07 pm    Follow-up:  PCP    In 1 week  As needed          Medications Prescribed:  Current Discharge Medication List        START taking these medications    Details   fluticasone propionate 50 MCG/ACT Nasal Suspension 2 sprays by Nasal route daily.  Qty: 16 g, Refills: 0      ondansetron 4 MG Oral Tablet Dispersible Take 1 tablet (4 mg total) by mouth every 4 (four) hours as needed for Nausea.  Qty: 10 tablet, Refills: 0                 Supplementary Documentation:          [id number]    Patient upon discharge stated that her  child recently tested positive for RSV and strep.  Was requesting a strep test before she left.  Strep testing was done and negative.

## 2024-12-18 NOTE — ED INITIAL ASSESSMENT (HPI)
Presents for persistent headache. Fevers and congestion since Sunday. Needed to use albuterol at home with mild improvement. Vomiting at home on Monday/Tuesday.